# Patient Record
Sex: FEMALE | Race: WHITE | NOT HISPANIC OR LATINO | URBAN - METROPOLITAN AREA
[De-identification: names, ages, dates, MRNs, and addresses within clinical notes are randomized per-mention and may not be internally consistent; named-entity substitution may affect disease eponyms.]

---

## 2020-10-19 ENCOUNTER — OFFICE VISIT (OUTPATIENT)
Dept: URBAN - METROPOLITAN AREA CLINIC 96 | Facility: CLINIC | Age: 55
End: 2020-10-19
Payer: COMMERCIAL

## 2020-10-19 ENCOUNTER — WEB ENCOUNTER (OUTPATIENT)
Dept: URBAN - METROPOLITAN AREA CLINIC 96 | Facility: CLINIC | Age: 55
End: 2020-10-19

## 2020-10-19 DIAGNOSIS — K58.2 IRRITABLE BOWEL SYNDROME WITH BOTH CONSTIPATION AND DIARRHEA: ICD-10-CM

## 2020-10-19 DIAGNOSIS — R19.4 CHANGE IN BOWEL HABITS: ICD-10-CM

## 2020-10-19 DIAGNOSIS — K59.00 CONSTIPATION, UNSPECIFIED CONSTIPATION TYPE: ICD-10-CM

## 2020-10-19 PROBLEM — 14760008: Status: ACTIVE | Noted: 2020-10-18

## 2020-10-19 PROBLEM — 10743008: Status: ACTIVE | Noted: 2020-10-19

## 2020-10-19 PROCEDURE — 3017F COLORECTAL CA SCREEN DOC REV: CPT | Performed by: INTERNAL MEDICINE

## 2020-10-19 PROCEDURE — 99203 OFFICE O/P NEW LOW 30 MIN: CPT | Performed by: INTERNAL MEDICINE

## 2020-10-19 PROCEDURE — G9903 PT SCRN TBCO ID AS NON USER: HCPCS | Performed by: INTERNAL MEDICINE

## 2020-10-19 PROCEDURE — G8427 DOCREV CUR MEDS BY ELIG CLIN: HCPCS | Performed by: INTERNAL MEDICINE

## 2020-10-19 PROCEDURE — G8483 FLU IMM NO ADMIN DOC REA: HCPCS | Performed by: INTERNAL MEDICINE

## 2020-10-19 PROCEDURE — G8420 CALC BMI NORM PARAMETERS: HCPCS | Performed by: INTERNAL MEDICINE

## 2020-10-19 RX ORDER — LISDEXAMFETAMINE DIMESYLATE 20 MG/1
1 CAPSULE IN THE MORNING CAPSULE ORAL ONCE A DAY
Status: ACTIVE | COMMUNITY

## 2020-10-19 RX ORDER — ESCITALOPRAM OXALATE 20 MG/1
1 TABLET TABLET, FILM COATED ORAL ONCE A DAY
Status: ACTIVE | COMMUNITY

## 2020-10-19 NOTE — HPI-OTHER HISTORIES
Patient presents for eval for constipation. Prior EGD with Dr. Reyes 2/18/2010 with retained food in stomach and possible [place]'s. Pathology with inflammation, NO [place]'s. UTD with primary MD with normal labs. Hx of gastroparesis. Colonoscopy 4 years ago normal with Dr. Arredondo, screening exam, advised to return in 10 years.   Patient reports long standing issues with constipation and bloating "my whole life". Diagnosed in her 20's with IBS.  Will have alternating diarrhea. Takes senna QPM for the past 1 year which has helped. Reports post prandial bloating and flatulence. No abdominal pain, no unintentional weight loss. Linzess trial as provided by her primary MD 72 mcg caused diarrhea. No prior Trulance or Amitiza trial. Prior Miralax did not help.  No food allergies, no diet restrictions. Not GFD. No prior celiac disease testing. Takes meds for anxiety, stable on meds. UTD with GYN, post menopausal. Has been taking probiotics for the past 2 month. No response with low FODMAP diet provided. Daughter with IBS, treated with Amitiza.

## 2020-10-21 LAB
T-TRANSGLUTAMINASE (TTG) IGA: <2
T-TRANSGLUTAMINASE (TTG) IGG: 2

## 2020-10-22 ENCOUNTER — WEB ENCOUNTER (OUTPATIENT)
Dept: URBAN - METROPOLITAN AREA CLINIC 96 | Facility: CLINIC | Age: 55
End: 2020-10-22

## 2023-09-19 ENCOUNTER — OFFICE VISIT (OUTPATIENT)
Dept: URBAN - METROPOLITAN AREA CLINIC 96 | Facility: CLINIC | Age: 58
End: 2023-09-19

## 2023-09-19 ENCOUNTER — DASHBOARD ENCOUNTERS (OUTPATIENT)
Age: 58
End: 2023-09-19

## 2023-09-19 RX ORDER — LISDEXAMFETAMINE DIMESYLATE 20 MG/1
1 CAPSULE IN THE MORNING CAPSULE ORAL ONCE A DAY
Status: ACTIVE | COMMUNITY

## 2023-09-19 RX ORDER — ESCITALOPRAM OXALATE 20 MG/1
1 TABLET TABLET, FILM COATED ORAL ONCE A DAY
Status: ACTIVE | COMMUNITY

## 2023-09-19 NOTE — HPI-TODAY'S VISIT:
Patient is a 57-year-old female who presents to discuss diarrhea and reflux.  Last seen by Dr. Hester in 2020.  She has a prior EGD with Dr. Reyes on 2/18/2010 with retained food in stomach and possible [place]'s.  Pathology results with inflammation and no [place]'s.  She does have a history of gastroparesis.  Colonoscopy completed in 2016 with Dr. Millan and advised to repeat in 10 years.  She has a history of constipation and has trialed Linzess 72 mcg but it did cause diarrhea.